# Patient Record
Sex: FEMALE | Race: WHITE | Employment: FULL TIME | URBAN - METROPOLITAN AREA
[De-identification: names, ages, dates, MRNs, and addresses within clinical notes are randomized per-mention and may not be internally consistent; named-entity substitution may affect disease eponyms.]

---

## 2022-01-17 ENCOUNTER — HOSPITAL ENCOUNTER (OUTPATIENT)
Age: 62
Discharge: HOME | End: 2022-01-17
Payer: COMMERCIAL

## 2022-01-17 VITALS
RESPIRATION RATE: 18 BRPM | HEART RATE: 86 BPM | TEMPERATURE: 97 F | BODY MASS INDEX: 22.49 KG/M2 | SYSTOLIC BLOOD PRESSURE: 153 MMHG | OXYGEN SATURATION: 99 % | HEIGHT: 65 IN | WEIGHT: 135 LBS | DIASTOLIC BLOOD PRESSURE: 102 MMHG

## 2022-01-17 DIAGNOSIS — Z20.822 ENCOUNTER FOR LABORATORY TESTING FOR COVID-19 VIRUS: ICD-10-CM

## 2022-01-17 DIAGNOSIS — E87.1 HYPONATREMIA: Primary | ICD-10-CM

## 2022-01-17 DIAGNOSIS — R19.7 DIARRHEA, UNSPECIFIED TYPE: ICD-10-CM

## 2022-01-17 LAB
#MXD IC: 0.9 X10ˆ3/UL (ref 0.1–1)
BUN BLD-MCNC: 21 MG/DL (ref 7–18)
CHLORIDE BLD-SCNC: 96 MMOL/L (ref 98–112)
CO2 BLD-SCNC: 26 MMOL/L (ref 21–32)
CREAT BLD-MCNC: 1 MG/DL
GLUCOSE BLD-MCNC: 120 MG/DL (ref 70–99)
HCT VFR BLD AUTO: 42.1 %
HCT VFR BLD CALC: 50 %
HGB BLD-MCNC: 14.5 G/DL
ISTAT IONIZED CALCIUM FOR CHEM 8: 1.1 MMOL/L (ref 1.12–1.32)
LYMPHOCYTES # BLD AUTO: 1.5 X10ˆ3/UL (ref 1–4)
LYMPHOCYTES NFR BLD AUTO: 17.7 %
MCH RBC QN AUTO: 34 PG (ref 26–34)
MCHC RBC AUTO-ENTMCNC: 34.4 G/DL (ref 31–37)
MCV RBC AUTO: 98.8 FL (ref 80–100)
MIXED CELL %: 10.6 %
NEUTROPHILS # BLD AUTO: 6.3 X10ˆ3/UL (ref 1.5–7.7)
NEUTROPHILS NFR BLD AUTO: 71.7 %
PLATELET # BLD AUTO: 240 X10ˆ3/UL (ref 150–450)
POTASSIUM BLD-SCNC: 4.3 MMOL/L (ref 3.6–5.1)
RBC # BLD AUTO: 4.26 X10ˆ6/UL
SODIUM BLD-SCNC: 132 MMOL/L (ref 136–145)
WBC # BLD AUTO: 8.7 X10ˆ3/UL (ref 4–11)

## 2022-01-17 PROCEDURE — 80047 BASIC METABLC PNL IONIZED CA: CPT | Performed by: PHYSICIAN ASSISTANT

## 2022-01-17 PROCEDURE — 85025 COMPLETE CBC W/AUTO DIFF WBC: CPT | Performed by: PHYSICIAN ASSISTANT

## 2022-01-17 PROCEDURE — 99214 OFFICE O/P EST MOD 30 MIN: CPT | Performed by: PHYSICIAN ASSISTANT

## 2022-01-17 PROCEDURE — 96374 THER/PROPH/DIAG INJ IV PUSH: CPT | Performed by: PHYSICIAN ASSISTANT

## 2022-01-17 PROCEDURE — 96361 HYDRATE IV INFUSION ADD-ON: CPT | Performed by: PHYSICIAN ASSISTANT

## 2022-01-17 RX ORDER — 0.9 % SODIUM CHLORIDE 0.9 %
1000 INTRAVENOUS SOLUTION INTRAVENOUS ONCE
Status: COMPLETED | OUTPATIENT
Start: 2022-01-17 | End: 2022-01-17

## 2022-01-17 RX ORDER — KETOROLAC TROMETHAMINE 30 MG/ML
15 INJECTION, SOLUTION INTRAMUSCULAR; INTRAVENOUS ONCE
Status: COMPLETED | OUTPATIENT
Start: 2022-01-17 | End: 2022-01-17

## 2022-01-17 NOTE — ED INITIAL ASSESSMENT (HPI)
Pt states 10 days ago began having fever and body aches that lasted for 3 days, pt states then began having the chills, Pt states was not having appetite, but was having diarrhea. Pt states now feeling weak and states having vision changes.  Pt states teste

## 2022-01-18 NOTE — ED PROVIDER NOTES
Patient Seen in: [facility]      History   Patient presents with:  Diarrhea    Stated Complaint: fever, chills, vomitting, diarrhea, blurry vision, weak    Subjective:   HPI    19-year-old female presenting for diarrhea.   Patient states 10 da atraumatic. Right Ear: External ear normal.      Left Ear: External ear normal.      Nose: Nose normal.      Mouth/Throat:      Mouth: Mucous membranes are moist.   Eyes:      Extraocular Movements: Extraocular movements intact.       Pupils: Pupils ar guidelines with patient.   We discussed supportive care and ER return precautions    MDM                                  Disposition and Plan     Clinical Impression:  Hyponatremia  (primary encounter diagnosis)  Diarrhea, unspecified type  Encounter for l

## 2022-01-20 LAB — SARS-COV-2 RNA RESP QL NAA+PROBE: NOT DETECTED

## 2022-11-29 ENCOUNTER — HOSPITAL ENCOUNTER (OUTPATIENT)
Age: 62
Discharge: HOME | End: 2022-11-29
Payer: COMMERCIAL

## 2022-11-29 VITALS
WEIGHT: 142 LBS | HEIGHT: 65 IN | RESPIRATION RATE: 18 BRPM | OXYGEN SATURATION: 99 % | HEART RATE: 70 BPM | TEMPERATURE: 98 F | SYSTOLIC BLOOD PRESSURE: 161 MMHG | BODY MASS INDEX: 23.66 KG/M2 | DIASTOLIC BLOOD PRESSURE: 96 MMHG

## 2022-11-29 DIAGNOSIS — Z20.822 ENCOUNTER FOR LABORATORY TESTING FOR COVID-19 VIRUS: ICD-10-CM

## 2022-11-29 DIAGNOSIS — U07.1 LAB TEST POSITIVE FOR DETECTION OF COVID-19 VIRUS: Primary | ICD-10-CM

## 2022-11-29 LAB — SARS-COV-2 RNA RESP QL NAA+PROBE: DETECTED

## 2022-11-29 PROCEDURE — U0002 COVID-19 LAB TEST NON-CDC: HCPCS | Performed by: NURSE PRACTITIONER

## 2022-11-29 PROCEDURE — 99203 OFFICE O/P NEW LOW 30 MIN: CPT | Performed by: EMERGENCY MEDICINE

## 2022-11-29 NOTE — DISCHARGE INSTRUCTIONS
Follow these guidelines for taking care of yourself at home:  If symptoms are severe, rest at home for the first 2 to 3 days. Stay away from cigarette smoke - both your smoke and the smoke from others. You may use over-the-counter acetaminophen or ibuprofen for fever, muscle aching, and headache, unless another medicine was prescribed for this. Antibiotics aren't used to treat viral infections. If you have chronic liver or kidney disease or ever had a stomach ulcer or gastrointestinal bleeding, talk with your healthcare provider before using these medicines. No one who is younger than 25 and ill with a fever should take aspirin. It may cause severe disease or death. Your appetite may be poor, so a light diet is fine. Prevent dehydration by drinking 8 to 12, 8-ounce glasses of fluids each day. This may include water; orange juice; lemonade; apple, grape, and cranberry juice; clear fruit drinks; electrolyte replacement and sports drinks; and decaffeinated teas and coffee. If you've been diagnosed with a kidney disease, ask your healthcare provider how much and what types of fluids you should drink to prevent dehydration. If you have kidney disease, drinking too much fluid can cause it build up in your body and be dangerous to your health. Over-the-counter remedies won't shorten the length of the illness. But they may be helpful for symptoms such as cough, sore throat, nasal and sinus congestion, or diarrhea. Don't use decongestants if you have high blood pressure.

## 2025-07-12 ENCOUNTER — HOSPITAL ENCOUNTER (OUTPATIENT)
Age: 65
Discharge: ANOTHER HOSPITAL | End: 2025-07-12
Payer: COMMERCIAL

## 2025-07-12 VITALS
DIASTOLIC BLOOD PRESSURE: 82 MMHG | SYSTOLIC BLOOD PRESSURE: 105 MMHG | RESPIRATION RATE: 22 BRPM | HEART RATE: 98 BPM | OXYGEN SATURATION: 98 % | TEMPERATURE: 98 F

## 2025-07-12 DIAGNOSIS — E87.1 HYPONATREMIA: Primary | ICD-10-CM

## 2025-07-12 PROCEDURE — 99215 OFFICE O/P EST HI 40 MIN: CPT | Performed by: PHYSICIAN ASSISTANT

## 2025-07-12 NOTE — ED PROVIDER NOTES
Patient Seen in: [facility]    History     Chief Complaint   Patient presents with    Other     Pt states her labs drawn a few days ago showed low sodium level.     Stated Complaint: Concerned w/ sodium  levels    HPI    64-year-old female presents with chief complaint of hyponatremia.  Patient states she recently had blood work completed at an outside facility.  Patient states she was informed by her doctor that her sodium level was \"113\" and was directed to report to the emergency department.  Patient denies fever, chills, cough, earache, nasal drainage, sore throat, abdominal pain, nausea, vomiting, diarrhea, constipation, dysuria, hematuria, flank pain, rash, neck pain, neck swelling, restricted neck movement, dyspnea, chest pain, wheeze, hemoptysis, weakness, paresthesias, vision changes, altered mental status, loss of consciousness, amnesia.    Past Medical History[1]    Past Surgical History[2]         Family History[3]    Short Social Hx on File[4]    Review of Systems    Positive for stated complaint: Concerned w/ sodium  levels  Other systems are as noted in HPI.  Constitutional and vital signs reviewed.      All other systems reviewed and negative except as noted above.    PSFH elements reviewed from today and agreed except as otherwise stated in HPI.    Physical Exam     ED Triage Vitals [07/12/25 0804]   /82   Pulse 98   Resp 22   Temp 97.7 °F (36.5 °C)   Temp src Oral   SpO2 98 %   O2 Device None (Room air)       Current:/82   Pulse 98   Temp 97.7 °F (36.5 °C) (Oral)   Resp 22   SpO2 98%      PULSE OX within normal limits on room air as interpreted by this provider.    Constitutional: The patient is cooperative. Appears well-developed and well-nourished.  No acute distress.  Psychological: Alert, No abnormalities of mood, affect.  Head: Normocephalic/atraumatic.  Eyes: Pupils are equal round reactive to light.  Conjunctiva are within normal limits.  ENT: Oropharynx is  clear.  Neck: The neck is supple.  No meningeal signs.  Chest: There is no tenderness to the chest wall.  No CVA tenderness bilaterally.  Respiratory: Respiratory effort was normal.  There is no stridor.  Air entry is equal.  Cardiovascular: Regular rate and rhythm.  Capillary refill is brisk.    Genitourinary: Not examined.  Lymphatic: No gross lymphadenopathy noted.  Musculoskeletal: Musculoskeletal system is grossly intact.  There is no obvious deformity.  Neurological: Gross motor movement is intact in all 4 extremities.  Patient exhibits normal speech.  Skin: Skin is normal to inspection.  Warm and dry.  No obvious bruising.  No obvious rash.    ED Course   Labs Reviewed - No data to display    MDM     Physical exam remained stable as previously documented.  Physical exam findings discussed with patient.    Patient declined repeat blood work.    64-year-old female presents with chief complaint of hyponatremia discovered on recent blood work at outside facility.  Patient states that she was directed by her primary care physician to report to [facility] emergency department.  Patient's son and patient now deciding to decline immediate care workup and will report to [facility] emergency department.  Patient is agreeable.    Patient case discussed with Dr. Zeng.    Disposition and Plan     Clinical Impression:  1. Hyponatremia        Disposition:  Ic to ed    Follow-up:  No follow-up provider specified.    Medications Prescribed:  There are no discharge medications for this patient.                     [1]   Past Medical History:   Fatigue    loss of sleep    Headache(784.0)    Menopausal symptoms    Mood swings    Night sweats    Stress reaction   [2]   Past Surgical History:  Procedure Laterality Date    Other surgical history  1988    s/p bunionectomy    Other surgical history  1988    s/p hammer toe repair    Tonsillectomy      Tubal ligation     [3]   Family History  Problem Relation Age of Onset     Other (Cancer,gout, high blood pressure [Other]) Father         lung CA    Other (heart disorder,emphysema [Other]) Mother     Other (Other[Other]) Brother         COPD    Other (Other[Other]) Brother         COPD    Other (prostate cancer [Other]) Brother    [4]   Social History  Socioeconomic History    Marital status:    Occupational History    Occupation:      Employer: [employer]   Tobacco Use    Smoking status: Every Day     Current packs/day: 0.50     Types: Cigarettes   Substance and Sexual Activity    Alcohol use: Yes     Comment: occasional    Drug use: No   Other Topics Concern    Caffeine Concern Yes     Comment: ,some tea    Exercise Yes     Comment: walks daily

## 2025-07-12 NOTE — ED INITIAL ASSESSMENT (HPI)
Pt reports she had labs drawn a few days ago and she had \"low sodium levels\" and \"I need to be replenished.\" Denies chest pain, SOB, abdominal pain, n/v/d.